# Patient Record
Sex: FEMALE | Race: WHITE | NOT HISPANIC OR LATINO
[De-identification: names, ages, dates, MRNs, and addresses within clinical notes are randomized per-mention and may not be internally consistent; named-entity substitution may affect disease eponyms.]

---

## 2023-05-10 ENCOUNTER — NON-APPOINTMENT (OUTPATIENT)
Age: 53
End: 2023-05-10

## 2023-05-10 PROBLEM — Z00.00 ENCOUNTER FOR PREVENTIVE HEALTH EXAMINATION: Status: ACTIVE | Noted: 2023-05-10

## 2023-06-07 ENCOUNTER — NON-APPOINTMENT (OUTPATIENT)
Age: 53
End: 2023-06-07

## 2023-06-07 ENCOUNTER — APPOINTMENT (OUTPATIENT)
Dept: BREAST CENTER | Facility: CLINIC | Age: 53
End: 2023-06-07
Payer: MEDICAID

## 2023-06-07 VITALS
HEIGHT: 68 IN | HEART RATE: 69 BPM | WEIGHT: 134 LBS | BODY MASS INDEX: 20.31 KG/M2 | DIASTOLIC BLOOD PRESSURE: 68 MMHG | SYSTOLIC BLOOD PRESSURE: 100 MMHG

## 2023-06-07 DIAGNOSIS — Z78.9 OTHER SPECIFIED HEALTH STATUS: ICD-10-CM

## 2023-06-07 DIAGNOSIS — R92.8 OTHER ABNORMAL AND INCONCLUSIVE FINDINGS ON DIAGNOSTIC IMAGING OF BREAST: ICD-10-CM

## 2023-06-07 PROCEDURE — 99205 OFFICE O/P NEW HI 60 MIN: CPT

## 2023-06-07 NOTE — PHYSICAL EXAM
[de-identified] : R breast/axilla/supraclavicular area: No masses, discharge, or adenopathy\par \par  [de-identified] : L breast/axilla/supraclavicular area: No discharge, or adenopathy\par L breast palpable 3 cm mass, in office US c/w lipoma (as diagrammed)\par

## 2023-06-07 NOTE — HISTORY OF PRESENT ILLNESS
[FreeTextEntry1] : Patient is a 51 yo F who presents today for initial evaluation of L stereo bx proven radial scar found on annual imaging as calcs 1:00. At the same time, patient underwent multiple benign bx of L mass w/ calcs and R calcs. She was referred by Dr. Hernandez. Denies prior breast surgeries or biopsies. Denies family history of breast or ovarian cancer. Patient denies skin changes or nipple discharge bilaterally. Of note, per patient, she has known stable L breast lipoma for many years.\par \par CHELY Lifetime Risk- 11.2%\par \par 3/24/23: B/l MG & US- heterogenously dense. L calcs UOQ/LIQ. R calcs UOQ/LIQ. US- B/l scattered cysts. L 0.6cm mass w/ calcs 8:00 2FN (rec US bx). L 4.0cm oval mass 8:00 6FN, may represent a hamartoma. Rec dxMG. BI-RADS 4\par 4/12/23: B/l MG- heterogenously dense. R multiple groups of amorphous/irregular calcs inferior, central and superior lateral (rec stereo bx x2). L multiple groups of amorphous/irregular calcs central and lateral (rec stereo bx). Management of other calcs pending bx results BI-RADS 4\par 4/12/23: L US bx mass w/ calcs 8:00 (ribbon clip)- breast parenchyma w/ CCC, cysts and rare scattered calcs in a background of dense stromal fibrosis. Benign and concordant- rec 6m f/u US\par 5/2/23: B/l stereo bx-\par SITE 1) L calcs 1:00 (cork clip)- Radial sclerosing papillary lesion associated w/ calcs and fibrocystic changes associated w/ calcs. High risk and concordant- rec surgical consult\par SITE 2) R calcs UOQ (cork clip)- Fibrocystic changes associated w/ calcs. Concordant\par SITE 3) R calcs 6:00 (hourglass clip)- Fibrocystic changes associated w/ rare calcs and vascular calcs. Concordant- numerous residual b/l microcalcs should be followed up with magnification studies in 6 months

## 2023-07-11 ENCOUNTER — NON-APPOINTMENT (OUTPATIENT)
Age: 53
End: 2023-07-11

## 2023-07-18 ENCOUNTER — APPOINTMENT (OUTPATIENT)
Dept: BREAST CENTER | Facility: AMBULATORY SURGERY CENTER | Age: 53
End: 2023-07-18
Payer: MEDICAID

## 2023-07-18 ENCOUNTER — RESULT REVIEW (OUTPATIENT)
Age: 53
End: 2023-07-18

## 2023-07-18 PROCEDURE — 14000 TIS TRNFR TRUNK 10 SQ CM/<: CPT | Mod: LT

## 2023-07-18 PROCEDURE — 19301 PARTIAL MASTECTOMY: CPT | Mod: LT

## 2023-07-18 PROCEDURE — 76098 X-RAY EXAM SURGICAL SPECIMEN: CPT | Mod: 26

## 2023-07-24 ENCOUNTER — NON-APPOINTMENT (OUTPATIENT)
Age: 53
End: 2023-07-24

## 2023-07-27 ENCOUNTER — NON-APPOINTMENT (OUTPATIENT)
Age: 53
End: 2023-07-27

## 2023-07-27 ENCOUNTER — APPOINTMENT (OUTPATIENT)
Dept: BREAST CENTER | Facility: CLINIC | Age: 53
End: 2023-07-27
Payer: MEDICAID

## 2023-07-27 VITALS
WEIGHT: 134 LBS | SYSTOLIC BLOOD PRESSURE: 100 MMHG | HEART RATE: 74 BPM | BODY MASS INDEX: 20.31 KG/M2 | DIASTOLIC BLOOD PRESSURE: 66 MMHG | HEIGHT: 68 IN

## 2023-07-27 DIAGNOSIS — Z78.9 OTHER SPECIFIED HEALTH STATUS: ICD-10-CM

## 2023-07-27 PROCEDURE — 99024 POSTOP FOLLOW-UP VISIT: CPT

## 2023-07-27 NOTE — HISTORY OF PRESENT ILLNESS
[FreeTextEntry1] : Patient is a 53 yo F who presents today for post op evaluation. S/p L nloc excisional bx 7/18/23 for radial scar. Surgical path yielded residual radial scare. Denies family history of breast or ovarian cancer. Patient denies skin changes or nipple discharge bilaterally. Of note, per patient, she has known stable L breast lipoma for many years.\par \par CHELY Lifetime Risk- 11.2%\par \par 3/24/23: B/l MG & US- heterogenously dense. L calcs UOQ/LIQ. R calcs UOQ/LIQ. US- B/l scattered cysts. L 0.6cm mass w/ calcs 8:00 2FN (rec US bx). L 4.0cm oval mass 8:00 6FN, may represent a hamartoma. Rec dxMG. BI-RADS 4\par 4/12/23: B/l MG- heterogenously dense. R multiple groups of amorphous/irregular calcs inferior, central and superior lateral (rec stereo bx x2). L multiple groups of amorphous/irregular calcs central and lateral (rec stereo bx). Management of other calcs pending bx results BI-RADS 4\par 4/12/23: L US bx mass w/ calcs 8:00 (ribbon clip)- breast parenchyma w/ CCC, cysts and rare scattered calcs in a background of dense stromal fibrosis. Benign and concordant- rec 6m f/u US\par 5/2/23: B/l stereo bx-\par SITE 1) L calcs 1:00 (cork clip)- Radial sclerosing papillary lesion associated w/ calcs and fibrocystic changes associated w/ calcs. High risk and concordant- rec surgical consult\par SITE 2) R calcs UOQ (cork clip)- Fibrocystic changes associated w/ calcs. Concordant\par SITE 3) R calcs 6:00 (hourglass clip)- Fibrocystic changes associated w/ rare calcs and vascular calcs. Concordant- numerous residual b/l microcalcs should be followed up with magnification studies in 6 months\par 7/18/23: L nloc excisional bx- residual radial sclerosing papillary lesion associated w/ calcs present at bx site changes, fibrocystic changes

## 2023-07-27 NOTE — PHYSICAL EXAM
[de-identified] : R breast/axilla/supraclavicular area: No masses, discharge, or adenopathy\par \par  [de-identified] : Well healing surgical excision\par

## 2023-11-03 ENCOUNTER — NON-APPOINTMENT (OUTPATIENT)
Age: 53
End: 2023-11-03

## 2023-12-15 ENCOUNTER — APPOINTMENT (OUTPATIENT)
Dept: BREAST CENTER | Facility: CLINIC | Age: 53
End: 2023-12-15

## 2024-03-25 ENCOUNTER — APPOINTMENT (OUTPATIENT)
Dept: BREAST CENTER | Facility: CLINIC | Age: 54
End: 2024-03-25

## 2024-05-16 ENCOUNTER — APPOINTMENT (OUTPATIENT)
Dept: BREAST CENTER | Facility: CLINIC | Age: 54
End: 2024-05-16
Payer: MEDICAID

## 2024-05-16 VITALS
BODY MASS INDEX: 20.31 KG/M2 | DIASTOLIC BLOOD PRESSURE: 74 MMHG | SYSTOLIC BLOOD PRESSURE: 108 MMHG | HEART RATE: 64 BPM | WEIGHT: 134 LBS | HEIGHT: 68 IN

## 2024-05-16 DIAGNOSIS — N64.9 DISORDER OF BREAST, UNSPECIFIED: ICD-10-CM

## 2024-05-16 DIAGNOSIS — N64.89 OTHER SPECIFIED DISORDERS OF BREAST: ICD-10-CM

## 2024-05-16 PROCEDURE — 99213 OFFICE O/P EST LOW 20 MIN: CPT

## 2024-05-16 NOTE — HISTORY OF PRESENT ILLNESS
[FreeTextEntry1] : Patient is a 54 yo F who presents today for breast cancer screening. S/p L nloc excisional bx 7/18/23 yielding residual radial scar. Denies family history of breast or ovarian cancer. Patient denies skin changes or nipple discharge bilaterally. Of note, per patient, she has known stable L breast lipoma for many years.  CHELY Lifetime Risk- 11.2%  3/24/23: B/l MG & US- heterogenously dense. L calcs UOQ/LIQ. R calcs UOQ/LIQ. US- B/l scattered cysts. L 0.6cm mass w/ calcs 8:00 2FN (rec US bx). L 4.0cm oval mass 8:00 6FN, may represent a hamartoma. Rec dxMG. BI-RADS 4 4/12/23: B/l MG- heterogeneously dense. R multiple groups of amorphous/irregular calcs inferior, central and superior lateral (rec stereo bx x2). L multiple groups of amorphous/irregular calcs central and lateral (rec stereo bx). Management of other calcs pending bx results BI-RADS 4 4/12/23: L US bx mass w/ calcs 8:00 (ribbon clip)- breast parenchyma w/ CCC, cysts and rare scattered calcs in a background of dense stromal fibrosis. Benign and concordant- rec 6m f/u US 5/2/23: B/l stereo bx- SITE 1) L calcs 1:00 (cork clip)- Radial sclerosing papillary lesion associated w/ calcs and fibrocystic changes associated w/ calcs. High risk and concordant- rec surgical consult SITE 2) R calcs UOQ (cork clip)- Fibrocystic changes associated w/ calcs. Concordant SITE 3) R calcs 6:00 (hourglass clip)- Fibrocystic changes associated w/ rare calcs and vascular calcs. Concordant- numerous residual b/l microcalcs should be followed up with magnification studies in 6 months 7/18/23: L nloc excisional bx- residual radial sclerosing papillary lesion associated w/ calcs present at bx site changes, fibrocystic changes  11/3/23: B/l MG & US- heterogeneously dense. B/l multiple stable scattered groups of punctate calcs (rec 6m f/u). US- L 0.6cm benign cyst 1:00 RA. L bx clip 8:00 2FN. L 4.1cm stable echogenic mass 8:00 6FN (rec 6m f/u). BI-RADS 3. 5/16/24: B/l MG & L US- heterogeneously dense. B/l probably benign unchanged calcs. Probably benign L8:00 mass on US (f/u imaging in 6 mo, diagnostic bilateral MMG and Left US) BI-RADS 3

## 2024-05-16 NOTE — PHYSICAL EXAM
[Normocephalic] : normocephalic [EOMI] : extra ocular movement intact [Supple] : supple [No Supraclavicular Adenopathy] : no supraclavicular adenopathy [No Cervical Adenopathy] : no cervical adenopathy [de-identified] : R breast/axilla/supraclavicular area: No masses, discharge, or adenopathy\par  \par   [de-identified] : L breast/axilla/supraclavicular area - no masses, discharge or adenopathy

## 2024-11-04 ENCOUNTER — NON-APPOINTMENT (OUTPATIENT)
Age: 54
End: 2024-11-04

## 2024-12-05 ENCOUNTER — RESULT REVIEW (OUTPATIENT)
Age: 54
End: 2024-12-05

## 2025-05-22 ENCOUNTER — APPOINTMENT (OUTPATIENT)
Dept: BREAST CENTER | Facility: CLINIC | Age: 55
End: 2025-05-22